# Patient Record
Sex: FEMALE | Race: BLACK OR AFRICAN AMERICAN | NOT HISPANIC OR LATINO
[De-identification: names, ages, dates, MRNs, and addresses within clinical notes are randomized per-mention and may not be internally consistent; named-entity substitution may affect disease eponyms.]

---

## 2019-01-30 ENCOUNTER — RESULT REVIEW (OUTPATIENT)
Age: 54
End: 2019-01-30

## 2022-01-24 ENCOUNTER — TRANSCRIPTION ENCOUNTER (OUTPATIENT)
Age: 57
End: 2022-01-24

## 2024-09-27 ENCOUNTER — EMERGENCY (EMERGENCY)
Facility: HOSPITAL | Age: 59
LOS: 1 days | Discharge: HOME | End: 2024-09-27
Attending: STUDENT IN AN ORGANIZED HEALTH CARE EDUCATION/TRAINING PROGRAM
Payer: COMMERCIAL

## 2024-09-27 VITALS
SYSTOLIC BLOOD PRESSURE: 130 MMHG | HEIGHT: 63 IN | DIASTOLIC BLOOD PRESSURE: 77 MMHG | TEMPERATURE: 99 F | OXYGEN SATURATION: 98 % | HEART RATE: 93 BPM | WEIGHT: 156.97 LBS | RESPIRATION RATE: 19 BRPM

## 2024-09-27 VITALS
SYSTOLIC BLOOD PRESSURE: 136 MMHG | HEART RATE: 77 BPM | TEMPERATURE: 101 F | DIASTOLIC BLOOD PRESSURE: 82 MMHG | RESPIRATION RATE: 18 BRPM | OXYGEN SATURATION: 99 %

## 2024-09-27 LAB
ALBUMIN SERPL ELPH-MCNC: 3.9 G/DL — SIGNIFICANT CHANGE UP (ref 3.3–5)
ALP SERPL-CCNC: 89 U/L — SIGNIFICANT CHANGE UP (ref 40–120)
ALT FLD-CCNC: 18 U/L — SIGNIFICANT CHANGE UP (ref 10–45)
ANION GAP SERPL CALC-SCNC: 13 MMOL/L — SIGNIFICANT CHANGE UP (ref 5–17)
APPEARANCE UR: CLEAR — SIGNIFICANT CHANGE UP
AST SERPL-CCNC: 22 U/L — SIGNIFICANT CHANGE UP (ref 10–40)
BACTERIA # UR AUTO: ABNORMAL /HPF
BASOPHILS # BLD AUTO: 0.04 K/UL — SIGNIFICANT CHANGE UP (ref 0–0.2)
BASOPHILS NFR BLD AUTO: 0.3 % — SIGNIFICANT CHANGE UP (ref 0–2)
BILIRUB SERPL-MCNC: 0.5 MG/DL — SIGNIFICANT CHANGE UP (ref 0.2–1.2)
BILIRUB UR-MCNC: NEGATIVE — SIGNIFICANT CHANGE UP
BUN SERPL-MCNC: 7 MG/DL — SIGNIFICANT CHANGE UP (ref 7–23)
CALCIUM SERPL-MCNC: 9 MG/DL — SIGNIFICANT CHANGE UP (ref 8.4–10.5)
CAST: 0 /LPF — SIGNIFICANT CHANGE UP (ref 0–4)
CHLORIDE SERPL-SCNC: 100 MMOL/L — SIGNIFICANT CHANGE UP (ref 96–108)
CO2 SERPL-SCNC: 20 MMOL/L — LOW (ref 22–31)
COLOR SPEC: YELLOW — SIGNIFICANT CHANGE UP
CREAT SERPL-MCNC: 0.8 MG/DL — SIGNIFICANT CHANGE UP (ref 0.5–1.3)
DIFF PNL FLD: ABNORMAL
EGFR: 85 ML/MIN/1.73M2 — SIGNIFICANT CHANGE UP
EOSINOPHIL # BLD AUTO: 0 K/UL — SIGNIFICANT CHANGE UP (ref 0–0.5)
EOSINOPHIL NFR BLD AUTO: 0 % — SIGNIFICANT CHANGE UP (ref 0–6)
GLUCOSE SERPL-MCNC: 120 MG/DL — HIGH (ref 70–99)
GLUCOSE UR QL: NEGATIVE MG/DL — SIGNIFICANT CHANGE UP
HCG SERPL-ACNC: <2 MIU/ML — SIGNIFICANT CHANGE UP
HCT VFR BLD CALC: 38.5 % — SIGNIFICANT CHANGE UP (ref 34.5–45)
HGB BLD-MCNC: 13.2 G/DL — SIGNIFICANT CHANGE UP (ref 11.5–15.5)
IMM GRANULOCYTES NFR BLD AUTO: 0.5 % — SIGNIFICANT CHANGE UP (ref 0–0.9)
KETONES UR-MCNC: NEGATIVE MG/DL — SIGNIFICANT CHANGE UP
LACTATE BLDV-MCNC: 1.6 MMOL/L — SIGNIFICANT CHANGE UP (ref 0.5–2)
LEUKOCYTE ESTERASE UR-ACNC: ABNORMAL
LIDOCAIN IGE QN: 17 U/L — SIGNIFICANT CHANGE UP (ref 7–60)
LYMPHOCYTES # BLD AUTO: 1.11 K/UL — SIGNIFICANT CHANGE UP (ref 1–3.3)
LYMPHOCYTES # BLD AUTO: 9.6 % — LOW (ref 13–44)
MCHC RBC-ENTMCNC: 31.5 PG — SIGNIFICANT CHANGE UP (ref 27–34)
MCHC RBC-ENTMCNC: 34.3 GM/DL — SIGNIFICANT CHANGE UP (ref 32–36)
MCV RBC AUTO: 91.9 FL — SIGNIFICANT CHANGE UP (ref 80–100)
MONOCYTES # BLD AUTO: 0.5 K/UL — SIGNIFICANT CHANGE UP (ref 0–0.9)
MONOCYTES NFR BLD AUTO: 4.3 % — SIGNIFICANT CHANGE UP (ref 2–14)
NEUTROPHILS # BLD AUTO: 9.83 K/UL — HIGH (ref 1.8–7.4)
NEUTROPHILS NFR BLD AUTO: 85.3 % — HIGH (ref 43–77)
NITRITE UR-MCNC: NEGATIVE — SIGNIFICANT CHANGE UP
NRBC # BLD: 0 /100 WBCS — SIGNIFICANT CHANGE UP (ref 0–0)
PH UR: 5.5 — SIGNIFICANT CHANGE UP (ref 5–8)
PLATELET # BLD AUTO: 230 K/UL — SIGNIFICANT CHANGE UP (ref 150–400)
POTASSIUM SERPL-MCNC: 3.7 MMOL/L — SIGNIFICANT CHANGE UP (ref 3.5–5.3)
POTASSIUM SERPL-SCNC: 3.7 MMOL/L — SIGNIFICANT CHANGE UP (ref 3.5–5.3)
PROT SERPL-MCNC: 7.8 G/DL — SIGNIFICANT CHANGE UP (ref 6–8.3)
PROT UR-MCNC: SIGNIFICANT CHANGE UP MG/DL
RBC # BLD: 4.19 M/UL — SIGNIFICANT CHANGE UP (ref 3.8–5.2)
RBC # FLD: 12.6 % — SIGNIFICANT CHANGE UP (ref 10.3–14.5)
RBC CASTS # UR COMP ASSIST: 11 /HPF — HIGH (ref 0–4)
SODIUM SERPL-SCNC: 133 MMOL/L — LOW (ref 135–145)
SP GR SPEC: 1.02 — SIGNIFICANT CHANGE UP (ref 1–1.03)
SQUAMOUS # UR AUTO: 4 /HPF — SIGNIFICANT CHANGE UP (ref 0–5)
TROPONIN T, HIGH SENSITIVITY RESULT: <6 NG/L — SIGNIFICANT CHANGE UP (ref 0–51)
UROBILINOGEN FLD QL: 0.2 MG/DL — SIGNIFICANT CHANGE UP (ref 0.2–1)
WBC # BLD: 11.54 K/UL — HIGH (ref 3.8–10.5)
WBC # FLD AUTO: 11.54 K/UL — HIGH (ref 3.8–10.5)
WBC UR QL: 19 /HPF — HIGH (ref 0–5)

## 2024-09-27 PROCEDURE — 99285 EMERGENCY DEPT VISIT HI MDM: CPT | Mod: 25

## 2024-09-27 PROCEDURE — 93005 ELECTROCARDIOGRAM TRACING: CPT

## 2024-09-27 PROCEDURE — 83605 ASSAY OF LACTIC ACID: CPT

## 2024-09-27 PROCEDURE — 81001 URINALYSIS AUTO W/SCOPE: CPT

## 2024-09-27 PROCEDURE — 84702 CHORIONIC GONADOTROPIN TEST: CPT

## 2024-09-27 PROCEDURE — 80053 COMPREHEN METABOLIC PANEL: CPT

## 2024-09-27 PROCEDURE — 74177 CT ABD & PELVIS W/CONTRAST: CPT | Mod: 26,MC

## 2024-09-27 PROCEDURE — 99053 MED SERV 10PM-8AM 24 HR FAC: CPT

## 2024-09-27 PROCEDURE — 96375 TX/PRO/DX INJ NEW DRUG ADDON: CPT

## 2024-09-27 PROCEDURE — 74177 CT ABD & PELVIS W/CONTRAST: CPT | Mod: MC

## 2024-09-27 PROCEDURE — 83690 ASSAY OF LIPASE: CPT

## 2024-09-27 PROCEDURE — 84484 ASSAY OF TROPONIN QUANT: CPT

## 2024-09-27 PROCEDURE — 87086 URINE CULTURE/COLONY COUNT: CPT

## 2024-09-27 PROCEDURE — 99285 EMERGENCY DEPT VISIT HI MDM: CPT

## 2024-09-27 PROCEDURE — 85025 COMPLETE CBC W/AUTO DIFF WBC: CPT

## 2024-09-27 PROCEDURE — 96374 THER/PROPH/DIAG INJ IV PUSH: CPT

## 2024-09-27 RX ORDER — METRONIDAZOLE 250 MG
1 TABLET ORAL
Qty: 14 | Refills: 0
Start: 2024-09-27 | End: 2024-10-03

## 2024-09-27 RX ORDER — ACETAMINOPHEN 325 MG/1
975 TABLET ORAL ONCE
Refills: 0 | Status: COMPLETED | OUTPATIENT
Start: 2024-09-27 | End: 2024-09-27

## 2024-09-27 RX ORDER — IBUPROFEN 600 MG
1 TABLET ORAL
Qty: 28 | Refills: 0
Start: 2024-09-27 | End: 2024-10-03

## 2024-09-27 RX ORDER — ONDANSETRON 2 MG/ML
4 INJECTION, SOLUTION INTRAMUSCULAR; INTRAVENOUS ONCE
Refills: 0 | Status: COMPLETED | OUTPATIENT
Start: 2024-09-27 | End: 2024-09-27

## 2024-09-27 RX ORDER — ACETAMINOPHEN 325 MG/1
2 TABLET ORAL
Qty: 56 | Refills: 0
Start: 2024-09-27 | End: 2024-10-03

## 2024-09-27 RX ORDER — SODIUM CHLORIDE 9 MG/ML
1000 INJECTION INTRAMUSCULAR; INTRAVENOUS; SUBCUTANEOUS ONCE
Refills: 0 | Status: COMPLETED | OUTPATIENT
Start: 2024-09-27 | End: 2024-09-27

## 2024-09-27 RX ORDER — METRONIDAZOLE 250 MG
500 TABLET ORAL ONCE
Refills: 0 | Status: COMPLETED | OUTPATIENT
Start: 2024-09-27 | End: 2024-09-27

## 2024-09-27 RX ORDER — SUCRALFATE 1 G/10ML
1 SUSPENSION ORAL
Qty: 28 | Refills: 0
Start: 2024-09-27 | End: 2024-10-03

## 2024-09-27 RX ADMIN — ONDANSETRON 4 MILLIGRAM(S): 2 INJECTION, SOLUTION INTRAMUSCULAR; INTRAVENOUS at 06:47

## 2024-09-27 RX ADMIN — ACETAMINOPHEN 975 MILLIGRAM(S): 325 TABLET ORAL at 10:16

## 2024-09-27 RX ADMIN — Medication 4 MILLIGRAM(S): at 07:20

## 2024-09-27 RX ADMIN — SODIUM CHLORIDE 1000 MILLILITER(S): 9 INJECTION INTRAMUSCULAR; INTRAVENOUS; SUBCUTANEOUS at 06:47

## 2024-09-27 RX ADMIN — Medication 500 MILLIGRAM(S): at 09:45

## 2024-09-27 RX ADMIN — Medication 4 MILLIGRAM(S): at 06:43

## 2024-09-27 NOTE — ED PROVIDER NOTE - NSFOLLOWUPINSTRUCTIONS_ED_ALL_ED_FT
Fall Prevention    WHAT YOU NEED TO KNOW:    Fall prevention includes ways to make your home and other areas safer. It also includes ways you can move more carefully to prevent a fall. Health conditions that cause changes in your blood pressure, vision, or muscle strength and coordination may increase your risk for falls. Medicines may also increase your risk for falls if they make you dizzy, weak, or sleepy.     DISCHARGE INSTRUCTIONS:    Call 911 or have someone else call if:     You have fallen and are unconscious.      You have fallen and cannot move part of your body.    Contact your healthcare provider if:     You have fallen and have pain or a headache.      You have questions or concerns about your condition or care.    Fall prevention tips:     Stand or sit up slowly. This may help you keep your balance and prevent falls.      Use assistive devices as directed. Your healthcare provider may suggest that you use a cane or walker to help you keep your balance. You may need to have grab bars put in your bathroom near the toilet or in the shower.      Wear shoes that fit well and have soles that . Wear shoes both inside and outside. Use slippers with good . Do not wear shoes with high heels.      Wear a personal alarm. This is a device that allows you to call 911 if you fall and need help. Ask your healthcare provider for more information.      Stay active. Exercise can help strengthen your muscles and improve your balance. Your healthcare provider may recommend water aerobics or walking. He or she may also recommend physical therapy to improve your coordination. Never start an exercise program without talking to your healthcare provider first.       Manage your medical conditions. Keep all appointments with your healthcare providers. Visit your eye doctor as directed.    Home safety tips:     Add items to prevent falls in the bathroom. Put nonslip strips on your bath or shower floor to prevent you from slipping. Use a bath mat if you do not have carpet in the bathroom. This will prevent you from falling when you step out of the bath or shower. Use a shower seat so you do not need to stand while you shower. Sit on the toilet or a chair in your bathroom to dry yourself and put on clothing. This will prevent you from losing your balance from drying or dressing yourself while you are standing.       Keep paths clear. Remove books, shoes, and other objects from walkways and stairs. Place cords for telephones and lamps out of the way so that you do not need to walk over them. Tape them down if you cannot move them. Remove small rugs. If you cannot remove a rug, secure it with double-sided tape. This will prevent you from tripping.       Install bright lights in your home. Use night lights to help light paths to the bathroom or kitchen. Always turn on the light before you start walking.      Keep items you use often on shelves within reach. Do not use a step stool to help you reach an item.      Paint or place reflective tape on the edges of your stairs. This will help you see the stairs better.    Follow up with your healthcare provider as directed: Write down your questions so you remember to ask them during your visits. You were seen for abdominal pain     You were found to have colitis with diverticulosis. we gave you antibiotics and sent them to your pharmacy.     You were also incidentally  found to have Biliary and pancreatic duct dilation, without significant change in   comparison to 4/2/2015. you need to follow up with Gastroenterology regarding these finding.    -- Please use 1,000mg Tylenol (also called acetaminophen) every 6 hours & 400mg Motrin (also called Advil or ibuprofen) every 6 hours as needed for pain/discomfort/swelling. You can get these without a prescription. Don't use more than 3500mg of Tylenol in any 24-hour period. Make sure your other prescription/over-the-counter medications don't contain any Tylenol so you don't take too much. If you have any stomach discomfort while taking Motrin, you can use TUMS or Pepcid or Zantac (these can all be bought without a prescription).    If you continue to have discomfort in your stomach:    You can also use Maalox, Carafate, Pepto-Bismol for abdominal discomfort as needed.  These medications can be bought over-the-counter without a prescription.  Please follow all package instructions.    Please return to the emergency department if your symptoms worsen or you develop any new symptoms such as  fever, chest pain, worsening abdominal pain, headache, vision changes, black or bloody stools, difficulty breathing, swelling in your legs, inability to eat or drink, severe diarrhea, severe vomiting. You were seen for abdominal pain     You were found to have colitis with diverticulosis. we gave you antibiotics and sent them to your pharmacy.     You were also incidentally  found to have Biliary and pancreatic duct dilation, without significant change in   comparison to 4/2/2015. you need to follow up with Gastroenterology regarding these finding.    Please follow up with your primary care doctor this week. please bring all results from todays visit     -- Please use 1,000mg Tylenol (also called acetaminophen) every 6 hours & 400mg Motrin (also called Advil or ibuprofen) every 6 hours as needed for pain/discomfort/swelling. You can get these without a prescription. Don't use more than 3500mg of Tylenol in any 24-hour period. Make sure your other prescription/over-the-counter medications don't contain any Tylenol so you don't take too much. If you have any stomach discomfort while taking Motrin, you can use TUMS or Pepcid or Zantac (these can all be bought without a prescription).    If you continue to have discomfort in your stomach:    You can also use Maalox, Carafate, Pepto-Bismol for abdominal discomfort as needed.  These medications can be bought over-the-counter without a prescription.  Please follow all package instructions.    Please return to the emergency department if your symptoms worsen or you develop any new symptoms such as  fever, chest pain, worsening abdominal pain, headache, vision changes, black or bloody stools, difficulty breathing, swelling in your legs, inability to eat or drink, severe diarrhea, severe vomiting.

## 2024-09-27 NOTE — ED ADULT NURSE NOTE - OBJECTIVE STATEMENT
PT is a 58yo F coming from home c/o abd pain x2days. PT states that she does not remember doing anything out of ordinary, sudden onset abd pain around umbilical area worsened by diarrhea. PT endorses nausea, chills, RLE cramping and decreased PO intake. PMH of prediabetes. PT A,Ox4, ambulatory at baseline. Respirations even and unlabored, abd soft, nondistended and tender to palpation, skin warm, dry and intact, EDDY. Denies HA, CP, SOB, vomiting, fever, and urinary symptoms. Stretcher locked in lowest position, appropriate side rails up for safety, pt instructed to call for RN if anything needed.

## 2024-09-27 NOTE — ED ADULT NURSE REASSESSMENT NOTE - NS ED NURSE REASSESS COMMENT FT1
0700 Report received from AMOS HUSSEIN Pt AAOx3, NAD, resp nonlabored, skin warm/dry, resting comfortably in bed . Pt denies headache, dizziness, chest pain, palpitations, SOB, abd pain, n/v/d, urinary symptoms, fevers, chills, weakness at this time. Pt awaiting for Ct and results  . Safety maintained 0700 Report received from AMOS HUSSEIN Pt AAOx3, NAD, resp nonlabored, skin warm/dry, resting in bed . Pt denies headache, dizziness, chest pain, palpitations, SOB, urinary symptoms, fevers, chills, weakness at this time. Pt awaiting for Ct and results  . Safety maintained

## 2024-09-27 NOTE — ED PROVIDER NOTE - PROGRESS NOTE DETAILS
Gina Pierre MD, PGY3:  signed out to me pending labs and imaging for eval of epigastric abdominal pain a/w N/V/D. Gina Pierre MD, PGY3:  labs show mild WBC, CT shows colitis, Colonic diverticulosis. Findings more support dx of colitis, with   acute uncomplicated diverticulitis also considered but less likely. there is also Biliary and pancreatic duct dilation, without significant change in  comparison to 4/2/2015 and of unclear significance. Gina Pierre MD, PGY3:  labs show mild WBC, CT shows colitis, Colonic diverticulosis. Findings more support dx of colitis, with   acute uncomplicated diverticulitis also considered but less likely. there is also Biliary and pancreatic duct dilation, without significant change in  comparison to 4/2/2015 and of unclear significance.    pt informed of all findings on labs and imaging. states pain improved after morphine, pt still diffusely tender to palpation. given tenderness and mild WBC elevation w/ mild left shift, w/ evidence of diverticulosis and UTI, will give abx. pt agrees with plan. will PO trial pt prior to dc. pt instructed to follow up with GI. pt agrees with plan. all questions answered at this time. strict return precautions given. Gina Pierre MD, PGY3:  labs show mild WBC, CT shows colitis, Colonic diverticulosis. Findings more support dx of colitis, with   acute uncomplicated diverticulitis also considered but less likely. there is also Biliary and pancreatic duct dilation, without significant change in  comparison to 4/2/2015 and of unclear significance.    pt informed of all findings on labs and imaging. states pain improved after morphine, pt still diffusely tender to palpation. given tenderness and mild WBC elevation w/ mild left shift, w/ evidence of diverticulosis and UTI, will give abx. pt agrees with plan. will PO trial pt prior to dc. pt instructed to follow up with GI and PCP. pt agrees with plan. all questions answered at this time. strict return precautions given. Gina Pierre MD, PGY3: pt tolerating PO without issue. Gina Pierre MD, PGY3:  upon discharge vitals, pt now with fever 101, further supporting indication for abx. will give tylenol prior to DC Gina Pierre MD, PGY3: pt tolerating PO without issue.  Patient verbalized understanding of discharge instructions and agrees with plan.  Patient ready for discharge. Gina Pierre MD, PGY3:  labs show mild WBC, CT shows colitis, Colonic diverticulosis. Findings more support dx of colitis, with   acute uncomplicated diverticulitis also considered but less likely. there is also Biliary and pancreatic duct dilation, without significant change in  comparison to 4/2/2015 and of unclear significance.    pt informed of all findings on labs and imaging. states pain improved after morphine, pt still diffusely tender to palpation. given tenderness and mild WBC elevation w/ mild left shift, w/ evidence of diverticulosis and UTI,  will give abx. pt did not tolerate PCN as a teen, declining PCN, will give metronidazole and cipro. pt agrees with plan. will PO trial pt prior to dc. pt instructed to follow up with GI and PCP. pt agrees with plan. all questions answered at this time. strict return precautions given.

## 2024-09-27 NOTE — ED PROVIDER NOTE - ATTENDING CONTRIBUTION TO CARE
I, Elijah Boucher, performed a history and physical exam of the patient and discussed their management with the resident provider. I reviewed the resident provider's note and agree with the documented findings and plan of care. I was present and available for all procedures.    Patient presenting with diffuse abdominal pain will obtain CT screening blood work pain medications IV antibiotics otherwise possible surgical consultation discussed with patient agreed with plan unlikely ACS PE pneumothorax dissection AAA pneumonia    Well appearing and in NAD, head normal appearing atraumatic, trachea midline, no respiratory distress, lungs cta bilaterally, rrr no murmurs, Distended soft diffuse abdominal pain abdomen, no visible extremity deformities, Alert and oriented, non focal neuro exam, skin warm and dry, normal affect and mood, no leg swelling, calf ttp or jvd

## 2024-09-27 NOTE — ED PROVIDER NOTE - OBJECTIVE STATEMENT
59 yr f past medical history of pre DM, no PSH presents due to abdominal pain. Started two days ago, has gotten better,  worse with diarrhea, has been taking Tylenol no relief. States it feels like a sharp pain behind the navel. She states it feel like "gas pain.: Rates it a 7/10 and non radiating. States she had a similar episode happen to her last year, she went to the hospital and resolved after she got magnesium. Denies fever, vomiting, Admits to chills and nausea  Does not take any medications

## 2024-09-27 NOTE — ED PROVIDER NOTE - SPECIALTY CARE
[facility] ambulatory encounter  INTERNAL MEDICINE FEMALE ANNUAL PHYSICAL EXAM    CHIEF COMPLAINT:  Physical       HISTORY OF PRESENT ILLNESS:    Katie Alcazar is a pleasant 46 year old female who presents today for an annual physical exam.    New concerns discussed include: pain in the right thumb and base of hand, especially with grasping objects. She does report some nighttime numbness and tingling as well which improves with position change. No daytime numbness or tingling. The right thumb pain might not be related to the numbness as it is not present in her left hand. No other joint pain. The thumb aches daily but does not seem to swell. Her mother has OA but her maternal aunt has RA.     HTN is controlled on amlodipine.     Headaches have essentially resolved.     Hm is up to date. She has mammogram tomorrow.     PROBLEM LIST:    Patient Active Problem List   Diagnosis   • HTN (hypertension)   • Headache       HISTORIES:    I have reviewed the past medical history, family history, social history, medications and allergies listed in the medical record as obtained by my nursing staff and support staff and agree with their documentation.    REVIEW OF SYSTEMS:    Constitutional:  No weight change.  No significant fatigue.  Eyes:  No visual disturbances.    HENT:  No hearing problems.  No ear pain.  No sore throat.   Respiratory:  No cough.  No wheezing.  No shortness of breath.    Cardiovascular:  No chest pain.  No palpitations.  No swelling.  Gastrointestinal:  No abdominal pain.  No frequent heartburn.  No nausea.  No vomiting.  No diarrhea.  No constipation.  No blood in stool.   Genitourinary:  No dysuria.  No frequency.  No hematuria.  No incontinence.   Extremities:  No significant joint swelling.  No joint pain.  Skin:  No change in moles.  No rash.   Neurologic:  No weakness.  No numbness.  No headache.  No dizziness.     Endocrine:  No heat intolerance.  No cold intolerance.  Psychiatric:  No  depression.  No appetite changes.  No changes in sleep.      PHYSICAL EXAM:  Vital Signs:    Visit Vitals  /90   Pulse 74   Temp 98.4 °F (36.9 °C) (Oral)   Resp 16   Ht 5' (1.524 m)   Wt 57.7 kg   LMP 08/10/2019 (Exact Date)   BMI 24.86 kg/m²       Constitutional:  Well developed, well nourished, no acute distress.   Eyes:  Pupils equal, round, reactive to light and accommodation, extraocular movements intact. Conjunctivae pink.  Sclerae anicteric.     HENT:  Normocephalic and atraumatic.  Bilateral external ears are normal.  On otoscopic exam, external auditory canals and tympanic membranes are within normal limits.  External nose appears normal.  Nasal mucosa, septum and turbinates appear normal.  Oropharynx moist and within normal limits.  Lips and gums within normal limits.  Neck:  Supple, nontender.  Normal range of motion.  No masses.  No thyromegaly.  Trachea midline.    Respiratory:  Clear to auscultation and percussion bilaterally.  No wheezes, rales, rhonchi or crackles.  Good respiratory effort.  No retraction or accessory muscle use.  Symmetrical chest expansion.    Cardiovascular:  Regular rate and rhythm. No murmurs, rubs, or gallops.  Point of maximal impulse nondisplaced.  Normal S1 and S2.  No S3 or S4.  No jugular venous distension.  No carotid bruits.  Good dorsalis pedis pulses bilaterally.  No peripheral edema.  Gastrointestinal:  Soft, nontender, nondistended.  No rebound or guarding.  Normal bowel sounds.  No hepatomegaly.  No splenomegaly.  No pulsatile or other abdominal masses.  No hernias noted.    Genitourinary:  Normal external genitalia.  No inguinal hernias are noted.    Musculoskeletal:  No clubbing, cyanosis or edema.  Full range of motion in all 4 extremities proximal and distal.  No back tenderness.    Neurologic:  Alert and oriented x3. Gait and station are normal.  Proximal and distal strength 5/5 in bilateral upper and lower extremities with normal tone.  No gross sensory  deficits noted.  Cranial nerves II-XII intact.    Skin:  Warm and dry.  No rashes, lesions or subcutaneous masses.    Lymphatic:  No lymphadenopathy in submental, submandibular, or cervical chain.  No supraclavicular lymphadenopathy.   Psychiatric:  The patient is cooperative and demonstrates good judgment, insight and affect.      LABORATORY DATA:    All pertinent laboratory results were reviewed.        Body mass index is 24.86 kg/m².    BMI ASSESSMENT/PLAN:  Patient BMI is within normal range.    Over the last 2 weeks, how often have you been bothered by the following problems?          PHQ2 Score:  0  1. Little interest or pleasure in doing things?:  0  2. Feeling down, depressed, or hopeless?:  0         DEPRESSION ASSESSMENT/PLAN:  Depression screening is negative no further plan needed.      ASSESSMENT:    1. Annual physical exam    2. Need for vaccination    3. Arthralgia of right hand    4. Essential hypertension        PLAN:    Annual physical exam  (primary encounter diagnosis)  Comment: completed  Plan: repeat in 1 year    Need for vaccination  Comment: due for Tdap  Plan: TETANUS DIPHTHERIA ACELLULAR PERTUSSIS VACC,         10+ YRS (BOOSTRIX)    Arthralgia of right hand  Comment: given family history eval for RA. May have separate component of CTS and handout given on symptoms and conservative supportive care  Plan: RHEUMATOID FACTOR, CYCLIC CITRULLINATED PEPTIDE        ANTIBODY IGG & IGA    Essential hypertension  Comment: stable, due for GFR  Plan: BASIC METABOLIC PANEL      Orders Placed This Encounter   • TETANUS DIPHTHERIA ACELLULAR PERTUSSIS VACC, 10+ YRS (BOOSTRIX)   • Basic Metabolic Panel   • Rheumatoid Factor   • Cyclic Citrullinated Peptide Antibody IgG & IgA       Return in about 1 year (around 9/3/2020) for physical.    Instructions provided as documented in the after visit summary.    The patient indicated understanding of the diagnosis and agreed with the plan of care.        Gastroenterology

## 2024-09-27 NOTE — ED ADULT NURSE REASSESSMENT NOTE - NS ED NURSE REASSESS COMMENT FT1
Pt verbalizes understanding to f/u with PCP/GI  and return to ED for any worsening symptoms. Patient d/c home w/ written and verbal instructions. Pt verbalized understanding. IV d/c - No redness or swelling /no bleeding

## 2024-09-27 NOTE — ED PROVIDER NOTE - CLINICAL SUMMARY MEDICAL DECISION MAKING FREE TEXT BOX
59 yr f past medical history of pre DM, no PSH presents due to abdominal pain. Started two days ago, has gotten better,  worse with diarrhea, has been taking Tylenol no relief. States it feels like a sharp pain behind the navel. She states it feel like "gas pain.: Rates it a 7/10 and non radiating. States she had a similar episode happen to her last year, she went to the hospital and resolved after she got magnesium. Denies fever, vomiting, Admits to chills diarrhea, and nausea  Does not take any medications     PE: lying on bed uncomfortable, MMM, lungs CTA, heart no MRG, diffuse abdominal tenderness worse over the periumbilical region, no CVA tenderness, no LE edema, LE non tender, no midline spinal tenderness   ddx: appendicitis, GI perf, pancreatitis, diverticulitis,   Plan: CBC, CMP,  lipase, lactate, CT AP w con coags pain control   I have a medium/strong suspicion for acute pathology. Warrants CT scan. We jumped into pain control immediately due to intense pain after abdominal palpation

## 2024-09-27 NOTE — ED PROVIDER NOTE - NSFOLLOWUPCLINICS_GEN_ALL_ED_FT
Gastroenterology at [facility]  Gastroenterology  [address] [place] [address]  Phone: [phone]  Fax:     [facility] [place]  Gastroenterology  [address] [place] [address]  Phone: [phone]  Fax:     [facility]  Gastroenterology  [address]  [place] [address]  Phone: [phone]  Fax:

## 2024-09-27 NOTE — ED PROVIDER NOTE - PATIENT PORTAL LINK FT
You can access the FollowMyHealth Patient Portal offered by [facility] by registering at the following website: [email or web address]. By joining [facility]’s FollowMyHealth portal, you will also be able to view your health information using other applications (apps) compatible with our system.

## 2024-09-29 LAB
CULTURE RESULTS: SIGNIFICANT CHANGE UP
SPECIMEN SOURCE: SIGNIFICANT CHANGE UP